# Patient Record
Sex: FEMALE | Race: WHITE | NOT HISPANIC OR LATINO | Employment: FULL TIME | URBAN - METROPOLITAN AREA
[De-identification: names, ages, dates, MRNs, and addresses within clinical notes are randomized per-mention and may not be internally consistent; named-entity substitution may affect disease eponyms.]

---

## 2018-01-24 ENCOUNTER — OFFICE VISIT (OUTPATIENT)
Dept: OPTOMETRY | Facility: CLINIC | Age: 28
End: 2018-01-24
Payer: COMMERCIAL

## 2018-01-24 DIAGNOSIS — R51.9 FREQUENT HEADACHES: Primary | ICD-10-CM

## 2018-01-24 DIAGNOSIS — H52.13 MYOPIA OF BOTH EYES WITH ASTIGMATISM: ICD-10-CM

## 2018-01-24 DIAGNOSIS — H52.203 MYOPIA OF BOTH EYES WITH ASTIGMATISM: ICD-10-CM

## 2018-01-24 DIAGNOSIS — H50.012 ESOTROPIA OF LEFT EYE: ICD-10-CM

## 2018-01-24 PROCEDURE — 92004 COMPRE OPH EXAM NEW PT 1/>: CPT | Mod: S$GLB,,, | Performed by: OPTOMETRIST

## 2018-01-24 PROCEDURE — 99999 PR PBB SHADOW E&M-NEW PATIENT-LVL II: CPT | Mod: PBBFAC,,, | Performed by: OPTOMETRIST

## 2018-01-24 NOTE — PROGRESS NOTES
HPI     Concerns About Ocular Health    Additional comments: glasses rx           Comments   Last eye exam was approximately 2 years ago.    Pt states has been having more headaches causing blurred vision, with and   without glasses on. Patient denies flashes,  pain and double vision.   Floaters OU longstanding and stable. Not using any gtts. Has hx of eye sx   OU  to fix tropia, apprx 10 years ago. Pt here for glasses rx only today,   defers dilation            Last edited by Bhargavi Smith, PCT on 1/24/2018  9:32 AM.   (History)            Assessment /Plan     For exam results, see Encounter Report.    Frequent headaches    Esotropia of left eye    Myopia of both eyes with astigmatism            1-3.  Started job as  a few months ago.  Since then increase in headaches.  Wearing glasses helps but still gets them.  Also has diplopia at the end of the day on occasion.  History of strabismus surgery during childhood. Strabismus still present.  Consult Dr. Rodriguez for binocular vision evaluation and possible need for prism.

## 2018-01-31 ENCOUNTER — INITIAL CONSULT (OUTPATIENT)
Dept: OPTOMETRY | Facility: CLINIC | Age: 28
End: 2018-01-31
Payer: COMMERCIAL

## 2018-01-31 DIAGNOSIS — H52.13 MYOPIA OF BOTH EYES: Primary | ICD-10-CM

## 2018-01-31 PROCEDURE — 92015 DETERMINE REFRACTIVE STATE: CPT | Mod: S$GLB,,, | Performed by: OPTOMETRIST

## 2018-01-31 PROCEDURE — 92014 COMPRE OPH EXAM EST PT 1/>: CPT | Mod: S$GLB,,, | Performed by: OPTOMETRIST

## 2018-01-31 PROCEDURE — 99999 PR PBB SHADOW E&M-EST. PATIENT-LVL II: CPT | Mod: PBBFAC,,, | Performed by: OPTOMETRIST

## 2018-01-31 NOTE — LETTER
January 31, 2018      Sole Messer, OD  [address]  [facility] [address]           [facility]  [address]  [place] [address]  Phone: [phone]          Patient: Paula Rosas   MR Number: [id number]   YOB: 1990   Date of Visit: 1/31/2018       Dear Dr. Sole Messer:    Thank you for referring Paula Rosas to me for evaluation. Attached you will find relevant portions of my assessment and plan of care.    If you have questions, please do not hesitate to call me. I look forward to following Paula Rosas along with you.    Sincerely,    Kimberli Rodriguez, OD    Enclosure  CC:  No Recipients    If you would like to receive this communication electronically, please contact [email or web address] or [phone] to request more information on [employer] access.    For providers and/or their staff who would like to refer a patient to [facility], please contact us through our one-stop-shop provider referral line, [facility], at [phone].    If you feel you have received this communication in error or would no longer like to receive these types of communications, please e-mail [email or web address]

## 2018-01-31 NOTE — PROGRESS NOTES
HPI     Paula Rosas is a 27 y.o. Female who to establish eye care. Paula comes   in as a consult from Dr Messer for alternating esotropia.Paula reports   that one of her eyes is constantly turned in hey are never aligned.   Recently she started to work as a [place] and since than she has a lot of   headaches especially after working on the computer or up close for hours.   If she can take a break which usually helps to find relief she has to take   painkillers. She remembers a strabismus Sx when she was a child but does   not remember why it was not successful and why it was not redone. She also   noticed diplopia in the evening when she takes her glasses off.    (--)blurred vision  (+)Headaches  (+)diplopia  (--)flashes  (--)floaters  (--)pain  (--)Itching  (--)tearing  (--)burning  (--)Dryness  (--) OTC Drops  (--)Photophobia    Last edited by Kimberli Rodriguez, OD on 1/31/2018  2:54 PM. (History)        Review of Systems   Eyes: Positive for double vision.   Endo/Heme/Allergies:        Morphine, versed       Assessment /Plan     For exam results, see Encounter Report.    1. Myopia of both eyes    2. Esotropia (fixating right eye) with diplopia; s/p strab surgery as a child  - Paula is not interested in another strab surgery  - Prism spec rx:  Glasses Prescription (1/31/2018)        Sphere Cylinder Axis Dist [facility] Prism    Right -1.50 +0.75 030 20/20-2 7.5 DENNYS    Left -2.50 Sphere  20/20-2 7.5 DENNYS    Type:  SVL    Expiration Date:  2/1/2019        - Will try split prism, mat need to change this      Patient education; RTC in 1 year, sooner prn

## 2018-04-10 ENCOUNTER — OFFICE VISIT (OUTPATIENT)
Dept: OBSTETRICS AND GYNECOLOGY | Facility: CLINIC | Age: 28
End: 2018-04-10
Payer: COMMERCIAL

## 2018-04-10 VITALS
BODY MASS INDEX: 33.63 KG/M2 | DIASTOLIC BLOOD PRESSURE: 70 MMHG | SYSTOLIC BLOOD PRESSURE: 112 MMHG | WEIGHT: 182.75 LBS | HEIGHT: 62 IN

## 2018-04-10 DIAGNOSIS — Z71.85 HPV VACCINE COUNSELING: ICD-10-CM

## 2018-04-10 DIAGNOSIS — Z23 NEED FOR HPV VACCINATION: ICD-10-CM

## 2018-04-10 DIAGNOSIS — Z01.419 WELL WOMAN EXAM WITH ROUTINE GYNECOLOGICAL EXAM: Primary | ICD-10-CM

## 2018-04-10 DIAGNOSIS — G43.821 MENSTRUAL MIGRAINE WITH STATUS MIGRAINOSUS, NOT INTRACTABLE: ICD-10-CM

## 2018-04-10 PROCEDURE — 99385 PREV VISIT NEW AGE 18-39: CPT | Mod: SA,S$GLB,, | Performed by: NURSE PRACTITIONER

## 2018-04-10 PROCEDURE — 99999 PR PBB SHADOW E&M-EST. PATIENT-LVL III: CPT | Mod: PBBFAC,,, | Performed by: NURSE PRACTITIONER

## 2018-04-10 RX ORDER — DESOGESTREL AND ETHINYL ESTRADIOL 21-5 (28)
1 KIT ORAL DAILY
Qty: 30 TABLET | Refills: 3 | Status: SHIPPED | OUTPATIENT
Start: 2018-04-10 | End: 2018-12-05 | Stop reason: SDUPTHER

## 2018-04-10 NOTE — PROGRESS NOTES
CC: Annual  HPI: Pt is a 27 y.o.  female who presents for routine annual exam. She is a new patient to me. She uses nothing for contraception. She has never been sexually active. She does not want STD screening. She has never been to the GYN before. She has not received the HPV vaccine but is interested in getting it. Reports menstrual migraines. Interested in OCPs to help with it. Currently uses Midol and motrin prn. She does not smoke cigarettes. No family hx of breast cancer.       ROS:  GENERAL: Feeling well overall. Denies fever or chills.   SKIN: Denies rash or lesions.   HEAD: Denies head injury or headache.   NODES: Denies enlarged lymph nodes.   CHEST: Denies chest pain or shortness of breath.   CARDIOVASCULAR: Denies palpitations or left sided chest pain.   ABDOMEN: No abdominal pain, constipation, diarrhea, nausea, vomiting or rectal bleeding.   URINARY: No dysuria, hematuria, or burning on urination.  REPRODUCTIVE: See HPI.   BREASTS: Denies pain, lumps, or nipple discharge.   HEMATOLOGIC: No easy bruisability or excessive bleeding.   MUSCULOSKELETAL: Denies joint pain or swelling.   NEUROLOGIC: Denies syncope or weakness.   PSYCHIATRIC: Denies depression, anxiety or mood swings.    PE:   APPEARANCE: Well nourished, well developed, White female in no acute distress.  NODES: no cervical, supraclavicular, or inguinal lymphadenopathy  BREASTS: Symmetrical, no skin changes or visible lesions. No palpable masses, nipple discharge or adenopathy bilaterally.  ABDOMEN: Soft. No tenderness or masses. No distention. No hernias palpated. No CVA tenderness.  VULVA: No lesions. Normal external female genitalia.  URETHRAL MEATUS: Normal size and location, no lesions, no prolapse.  URETHRA: No masses, tenderness, or prolapse.  VAGINA: Moist. No lesions or lacerations noted. No abnormal discharge present. No odor present.   CERVIX: No lesions or discharge. No cervical motion tenderness. Pt unable to tolerate SSE and  unable to get pap smear.   UTERUS: Normal size, regular shape, mobile, non-tender.  ADNEXA: No tenderness. No fullness or masses palpated in the adnexal regions.   ANUS PERINEUM: Normal.    Diagnosis:  1. Well woman exam with routine gynecological exam    2. Need for HPV vaccination    3. HPV vaccine counseling    4. Menstrual migraine with status migrainosus, not intractable        Plan:     Orders Placed This Encounter    (In Office Administered) HPV Vaccine (9-Valent) (3 Dose) (IM)    desog-e.estradiol/e.estradiol (KARIVA) 0.15-0.02 mgx21 /0.01 mg x 5 per tablet     -HPV #1 scheduled and #2 in 2 months  -Rx OCPs continuously for menstrual migraines.  -Patient desires to begin contraception, risks, benefits and options discussed in detail.  The use of the oral contraceptive has been fully discussed with the patient. This includes the proper method to initiate (i.e. Sunday start after next normal menstrual onset) and continue the pills, the need for regular compliance to ensure adequate contraceptive effect, the physiology which make the pill effective, the instructions for what to do in event of a missed pill, and warnings about anticipated minor side effects such as breakthrough spotting, nausea, breast tenderness, weight changes, acne, headaches, etc. She has been told of the more serious potential side effects such as MI, stroke, and deep vein thrombosis, all of which are very unlikely. She has been asked to report any signs of such serious problems immediately. She should back up the pill with a condom during any cycle in which antibiotics are prescribed, and during the first cycle as well. The need for additional protection, such as a condom, to prevent exposure to sexually transmitted diseases has also been discussed- the patient has been clearly reminded that OCP's cannot protect her against diseases such as HIV and others. She understands and wishes to take the medication as prescribed.   Patient desire  to begin OCPs  Patient was counseled today on the new ACS guidelines for cervical cytology screening as well as the current recommendations for breast cancer screening. She was counseled to follow up with her PCP for other routine health maintenance. Counseling session lasted approximately 10 minutes, and all her questions were answered.    Follow-up with me in 1 year for routine exam  RTC in 3-4 months for OCP BP check

## 2018-04-12 ENCOUNTER — CLINICAL SUPPORT (OUTPATIENT)
Dept: OBSTETRICS AND GYNECOLOGY | Facility: CLINIC | Age: 28
End: 2018-04-12
Payer: COMMERCIAL

## 2018-04-12 PROCEDURE — 90651 9VHPV VACCINE 2/3 DOSE IM: CPT | Mod: S$GLB,,, | Performed by: NURSE PRACTITIONER

## 2018-04-12 PROCEDURE — 99999 PR PBB SHADOW E&M-EST. PATIENT-LVL I: CPT | Mod: PBBFAC,,,

## 2018-04-12 PROCEDURE — 90471 IMMUNIZATION ADMIN: CPT | Mod: S$GLB,,, | Performed by: NURSE PRACTITIONER

## 2018-04-12 NOTE — PROGRESS NOTES
Here for Gardasil # 1  injection, without complaint at this time. Reports no pain before or after injection. Advised to wait in lobby 15 minutes after injection and report any adverse reactions. Return to clinic in  1 month  for next injection.         Site: [facility]

## 2018-12-05 DIAGNOSIS — Z01.419 WELL WOMAN EXAM WITH ROUTINE GYNECOLOGICAL EXAM: ICD-10-CM

## 2018-12-05 DIAGNOSIS — G43.821 MENSTRUAL MIGRAINE WITH STATUS MIGRAINOSUS, NOT INTRACTABLE: ICD-10-CM

## 2018-12-06 RX ORDER — DESOGESTREL AND ETHINYL ESTRADIOL AND ETHINYL ESTRADIOL 21-5 (28)
KIT ORAL
Qty: 84 TABLET | Refills: 2 | Status: SHIPPED | OUTPATIENT
Start: 2018-12-06